# Patient Record
Sex: FEMALE | Race: WHITE | NOT HISPANIC OR LATINO | Employment: FULL TIME | URBAN - METROPOLITAN AREA
[De-identification: names, ages, dates, MRNs, and addresses within clinical notes are randomized per-mention and may not be internally consistent; named-entity substitution may affect disease eponyms.]

---

## 2019-04-01 ENCOUNTER — OFFICE VISIT (OUTPATIENT)
Dept: INTERNAL MEDICINE | Facility: CLINIC | Age: 24
End: 2019-04-01

## 2019-04-01 VITALS
DIASTOLIC BLOOD PRESSURE: 66 MMHG | SYSTOLIC BLOOD PRESSURE: 110 MMHG | WEIGHT: 221 LBS | TEMPERATURE: 97.9 F | HEART RATE: 76 BPM | OXYGEN SATURATION: 99 % | HEIGHT: 63 IN | BODY MASS INDEX: 39.16 KG/M2

## 2019-04-01 DIAGNOSIS — Z00.00 HEALTH CARE MAINTENANCE: ICD-10-CM

## 2019-04-01 DIAGNOSIS — Z97.5 IUD (INTRAUTERINE DEVICE) IN PLACE: ICD-10-CM

## 2019-04-01 DIAGNOSIS — R53.83 FATIGUE, UNSPECIFIED TYPE: Primary | ICD-10-CM

## 2019-04-01 DIAGNOSIS — R20.0 NUMBNESS AND TINGLING IN BOTH HANDS: ICD-10-CM

## 2019-04-01 DIAGNOSIS — R10.84 GENERALIZED ABDOMINAL PAIN: ICD-10-CM

## 2019-04-01 DIAGNOSIS — R20.2 NUMBNESS AND TINGLING IN BOTH HANDS: ICD-10-CM

## 2019-04-01 PROCEDURE — 99204 OFFICE O/P NEW MOD 45 MIN: CPT | Performed by: NURSE PRACTITIONER

## 2019-04-01 NOTE — PROGRESS NOTES
Subjective   Chief Complaint   Patient presents with   • Establish Care     np-est care    • GI Problem   • Back Pain       24 yo wf presents to establish care and discuss a few azeem issues.     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 Started in the righ and get tingly and numb. Straightening hair makes it worse. TheIn the left she will get a sharp pain that goes down the side of her wrist. The left wrist hurts at the end ot the day. She notes it doesn't stop her from doing the things she wants to do. She wears Cock up splints intermittently. She was advised to have steroid injections but she had an allergic reaction to a steroid injection int eht past and passed out so she was hesitant to do so. She doesn't take anything for pain.     She notes that her stomach has been giving her trouble for a long time--a couple of years. Initially it was intermitten but not it is more frequent. She notes that has been having pain that wakes her up at night and it feels like someone is stabbing her. She goes back and forth to the bathroom trying to have a BM. Once she has a BM the pain subsides for a few minutes and then comes back. This episode will finally go awy after 4-5 hours. She reports that she is having this 2-3 episodes like this a week. It is worse when she eats dairy. She has not eleiminated dairy as she really enjoys chees and ice cream. She notes it has not afected her appetite or weight. Greasy ffod make it really bad as well. She denies N/V. She notes it doesn't happen after eating.     She notes that she is fatigued which is different from her baseline.     She reports that she will get M-HA when something is resting on her head--headbands, glasses. She notes that they are infrequent and releived with Exredrin. Her dad has M-HA.     She notes that she had menstrual problems in the past. She notes that she had ammenorrhea fo a couple of years as a teenager. She  now has an IUD placed by [facility]. She has not seen them for 3-4 years.     She reports that she did get Guardisil there but is undure of her HAV vaccinations. Her mother has her immunization certificate.   Justine Fowler is a 23 y.o. female.     Patient Active Problem List   Diagnosis   • IUD (intrauterine device) in place   • Health care maintenance       Allergies not on file    No current outpatient medications on file prior to visit.     No current facility-administered medications on file prior to visit.        Past Medical History:   Diagnosis Date   • Headache     migraines   • Low back pain    • Menstrual cycle problem        Family History   Problem Relation Age of Onset   • Diabetes Maternal Grandfather    • Diabetes Paternal Grandmother        Social History     Socioeconomic History   • Marital status: Single     Spouse name: Not on file   • Number of children: Not on file   • Years of education: Not on file   • Highest education level: Not on file   Tobacco Use   • Smoking status: Never Smoker   Substance and Sexual Activity   • Alcohol use: Yes     Comment: socially        History reviewed. No pertinent surgical history.    The following portions of the patient's history were reviewed and updated as appropriate: problem list, allergies, current medications, past medical history, past family history, past social history and past surgical history.    Review of Systems   Constitutional: Positive for fatigue. Negative for activity change, appetite change, unexpected weight gain and unexpected weight loss.   HENT: Positive for congestion, postnasal drip and rhinorrhea.    Eyes: Negative.    Respiratory: Negative.    Cardiovascular: Negative.    Gastrointestinal: Positive for abdominal pain. Negative for abdominal distention, blood in stool, constipation, diarrhea, nausea and vomiting.   Endocrine: Negative for cold intolerance and heat intolerance.   Genitourinary: Negative for menstrual problem,  "pelvic pain and vaginal bleeding.   Musculoskeletal: Positive for arthralgias.   Allergic/Immunologic: Positive for environmental allergies and food allergies.   Neurological: Positive for numbness.   Hematological: Negative.    Psychiatric/Behavioral: Negative.        Objective   Vitals:    04/01/19 0953 04/01/19 1034   BP:  110/66   Pulse: 76    Temp: 97.9 °F (36.6 °C)    SpO2: 99%    Weight: 100 kg (221 lb)    Height: 160 cm (63\")      Physical Exam   Constitutional: She is oriented to person, place, and time. She appears well-developed and well-nourished.   HENT:   Head: Normocephalic and atraumatic.   Right Ear: External ear normal.   Left Ear: External ear normal.   Mouth/Throat: Oropharynx is clear and moist.   Nasal turbinates engorged; PND and cobblestoning noted.    Eyes: Conjunctivae are normal. No scleral icterus.   Neck: Neck supple. No thyromegaly present.   Cardiovascular: Normal rate, regular rhythm and normal heart sounds. Exam reveals no gallop and no friction rub.   No murmur heard.  Pulmonary/Chest: Effort normal and breath sounds normal.   Abdominal: Soft. Bowel sounds are normal. She exhibits no distension and no mass. There is no tenderness.   No hepatosplenomegaly.    Musculoskeletal:   Positive Phalen sign. No clubbing, cyanosis or edema. MCPs, PIPs and DIPs without erythema or swelling bilaterally.    Neurological: She is alert and oriented to person, place, and time.   Skin: Skin is warm and dry.   Psychiatric: She has a normal mood and affect.   Vitals reviewed.      Procedures    Assessment/Plan   Justine was seen today for establish care, gi problem and back pain.    Diagnoses and all orders for this visit:    Fatigue, unspecified type  -     CBC w AUTO Differential  -     Comprehensive metabolic panel  -     TSH    Numbness and tingling in both hands  -     Vitamin B12    Generalized abdominal pain  -     CBC w AUTO Differential  -     Comprehensive metabolic panel  -     TSH  -     " Celiac Disease Panel  -     Urinalysis With Microscopic - Urine, Clean Catch    Health care maintenance  -     Lipid Panel w/ Chol/HDL Ratio  -     Ambulatory Referral to Obstetrics / Gynecology    IUD (intrauterine device) in place  -     Ambulatory Referral to Obstetrics / Gynecology        Return in about 6 weeks (around 5/13/2019).     Encouraged use of her Cock-Up splints every night between now and when she follows up with me in 6 weeks. No anti-inflammatories at this time given that her stomach is giving her grief. Will proceed with lab work up as above and consider imaging and endoscopy in follow up. She has had her Guardisil series and her tetanus shot is UTD. She is unsure whether she has had her HAV vaccination but notes her mom has her immunization certificate--she will obtain a copy of this and we will go from there. We will get her scheduled with [facility] to check her IUD and have her PAP/pelvic exam. Will discuss LSM regarding her weight at her follow up visit.     She reports that she is allergic to steroids--we have contacted the PCPs office that she had the reaction in to determine specifically what medication she had the reaction to and record in her allergies list.

## 2019-04-01 NOTE — PATIENT INSTRUCTIONS
Encouraged use of Cock-Up splints nightly.    Encouraged her to to try eliminating dairy from her diet for the next 4-6 weeks given that her reported abdominal symptoms are worse with dairy.

## 2019-04-02 LAB
ALBUMIN SERPL-MCNC: 4.8 G/DL (ref 3.5–5.2)
ALBUMIN/GLOB SERPL: 1.8 G/DL
ALP SERPL-CCNC: 91 U/L (ref 39–117)
ALT SERPL-CCNC: 22 U/L (ref 1–33)
APPEARANCE UR: CLEAR
AST SERPL-CCNC: 21 U/L (ref 1–32)
BACTERIA #/AREA URNS HPF: ABNORMAL /HPF
BASOPHILS # BLD AUTO: 0.07 10*3/MM3 (ref 0–0.2)
BASOPHILS NFR BLD AUTO: 0.8 % (ref 0–1.5)
BILIRUB SERPL-MCNC: 0.3 MG/DL (ref 0.2–1.2)
BILIRUB UR QL STRIP: NEGATIVE
BUN SERPL-MCNC: 15 MG/DL (ref 6–20)
BUN/CREAT SERPL: 16.7 (ref 7–25)
CALCIUM SERPL-MCNC: 10.2 MG/DL (ref 8.6–10.5)
CASTS URNS MICRO: ABNORMAL
CHLORIDE SERPL-SCNC: 102 MMOL/L (ref 98–107)
CHOLEST SERPL-MCNC: 137 MG/DL (ref 0–200)
CHOLEST/HDLC SERPL: 3.26 {RATIO}
CO2 SERPL-SCNC: 24.3 MMOL/L (ref 22–29)
COLOR UR: YELLOW
CREAT SERPL-MCNC: 0.9 MG/DL (ref 0.57–1)
ENDOMYSIUM IGA SER QL: NEGATIVE
EOSINOPHIL # BLD AUTO: 0.18 10*3/MM3 (ref 0–0.4)
EOSINOPHIL NFR BLD AUTO: 2.2 % (ref 0.3–6.2)
EPI CELLS #/AREA URNS HPF: ABNORMAL /HPF
ERYTHROCYTE [DISTWIDTH] IN BLOOD BY AUTOMATED COUNT: 11.8 % (ref 12.3–15.4)
GLOBULIN SER CALC-MCNC: 2.7 GM/DL
GLUCOSE SERPL-MCNC: 93 MG/DL (ref 65–99)
GLUCOSE UR QL: NEGATIVE
HCT VFR BLD AUTO: 48.5 % (ref 34–46.6)
HDLC SERPL-MCNC: 42 MG/DL (ref 40–60)
HGB BLD-MCNC: 15.4 G/DL (ref 12–15.9)
HGB UR QL STRIP: NEGATIVE
IGA SERPL-MCNC: 366 MG/DL (ref 87–352)
IMM GRANULOCYTES # BLD AUTO: 0.02 10*3/MM3 (ref 0–0.05)
IMM GRANULOCYTES NFR BLD AUTO: 0.2 % (ref 0–0.5)
KETONES UR QL STRIP: NEGATIVE
LDLC SERPL CALC-MCNC: 65 MG/DL (ref 0–100)
LEUKOCYTE ESTERASE UR QL STRIP: NEGATIVE
LYMPHOCYTES # BLD AUTO: 2.3 10*3/MM3 (ref 0.7–3.1)
LYMPHOCYTES NFR BLD AUTO: 27.6 % (ref 19.6–45.3)
MCH RBC QN AUTO: 29.1 PG (ref 26.6–33)
MCHC RBC AUTO-ENTMCNC: 31.8 G/DL (ref 31.5–35.7)
MCV RBC AUTO: 91.5 FL (ref 79–97)
MONOCYTES # BLD AUTO: 0.43 10*3/MM3 (ref 0.1–0.9)
MONOCYTES NFR BLD AUTO: 5.2 % (ref 5–12)
NEUTROPHILS # BLD AUTO: 5.33 10*3/MM3 (ref 1.4–7)
NEUTROPHILS NFR BLD AUTO: 64 % (ref 42.7–76)
NITRITE UR QL STRIP: NEGATIVE
NRBC BLD AUTO-RTO: 0 /100 WBC (ref 0–0)
PH UR STRIP: 5.5 [PH] (ref 5–8)
PLATELET # BLD AUTO: 397 10*3/MM3 (ref 140–450)
POTASSIUM SERPL-SCNC: 4.3 MMOL/L (ref 3.5–5.2)
PROT SERPL-MCNC: 7.5 G/DL (ref 6–8.5)
PROT UR QL STRIP: NEGATIVE
RBC # BLD AUTO: 5.3 10*6/MM3 (ref 3.77–5.28)
RBC #/AREA URNS HPF: ABNORMAL /HPF
SODIUM SERPL-SCNC: 138 MMOL/L (ref 136–145)
SP GR UR: 1.03 (ref 1–1.03)
TRIGL SERPL-MCNC: 151 MG/DL (ref 0–150)
TSH SERPL DL<=0.005 MIU/L-ACNC: 1.68 MIU/ML (ref 0.27–4.2)
TTG IGA SER-ACNC: <2 U/ML (ref 0–3)
UROBILINOGEN UR STRIP-MCNC: (no result) MG/DL
VIT B12 SERPL-MCNC: 349 PG/ML (ref 211–946)
VLDLC SERPL CALC-MCNC: 30.2 MG/DL
WBC # BLD AUTO: 8.33 10*3/MM3 (ref 3.4–10.8)
WBC #/AREA URNS HPF: ABNORMAL /HPF